# Patient Record
Sex: MALE | Race: WHITE | NOT HISPANIC OR LATINO | Employment: OTHER | URBAN - METROPOLITAN AREA
[De-identification: names, ages, dates, MRNs, and addresses within clinical notes are randomized per-mention and may not be internally consistent; named-entity substitution may affect disease eponyms.]

---

## 2020-11-28 ENCOUNTER — OFFICE VISIT (OUTPATIENT)
Dept: URGENT CARE | Facility: CLINIC | Age: 65
End: 2020-11-28
Payer: MEDICARE

## 2020-11-28 VITALS
WEIGHT: 173 LBS | OXYGEN SATURATION: 95 % | TEMPERATURE: 98.1 F | HEIGHT: 66 IN | BODY MASS INDEX: 27.8 KG/M2 | SYSTOLIC BLOOD PRESSURE: 130 MMHG | DIASTOLIC BLOOD PRESSURE: 80 MMHG | HEART RATE: 95 BPM | RESPIRATION RATE: 16 BRPM

## 2020-11-28 DIAGNOSIS — R50.9 FEVER, UNSPECIFIED FEVER CAUSE: Primary | ICD-10-CM

## 2020-11-28 PROCEDURE — G0463 HOSPITAL OUTPT CLINIC VISIT: HCPCS | Performed by: PHYSICIAN ASSISTANT

## 2020-11-28 PROCEDURE — 99213 OFFICE O/P EST LOW 20 MIN: CPT | Performed by: PHYSICIAN ASSISTANT

## 2020-11-28 PROCEDURE — U0003 INFECTIOUS AGENT DETECTION BY NUCLEIC ACID (DNA OR RNA); SEVERE ACUTE RESPIRATORY SYNDROME CORONAVIRUS 2 (SARS-COV-2) (CORONAVIRUS DISEASE [COVID-19]), AMPLIFIED PROBE TECHNIQUE, MAKING USE OF HIGH THROUGHPUT TECHNOLOGIES AS DESCRIBED BY CMS-2020-01-R: HCPCS | Performed by: PHYSICIAN ASSISTANT

## 2020-11-28 RX ORDER — TAMSULOSIN HYDROCHLORIDE 0.4 MG/1
CAPSULE ORAL
COMMUNITY

## 2020-11-28 RX ORDER — FLUTICASONE PROPIONATE 50 MCG
1 SPRAY, SUSPENSION (ML) NASAL DAILY
Qty: 16 G | Refills: 0 | Status: SHIPPED | OUTPATIENT
Start: 2020-11-28

## 2020-11-28 RX ORDER — ALBUTEROL SULFATE 2.5 MG/3ML
2.5 SOLUTION RESPIRATORY (INHALATION) EVERY 4 HOURS PRN
COMMUNITY
Start: 2020-09-15

## 2020-11-28 RX ORDER — ROSUVASTATIN CALCIUM 10 MG/1
TABLET, COATED ORAL
COMMUNITY
Start: 2020-09-08

## 2020-11-28 RX ORDER — LEVOTHYROXINE SODIUM 0.05 MG/1
TABLET ORAL
COMMUNITY
Start: 2020-09-14

## 2020-11-28 RX ORDER — WARFARIN SODIUM 2.5 MG/1
1.25-2.5 TABLET ORAL
COMMUNITY
Start: 2020-07-20

## 2020-11-28 RX ORDER — ESOMEPRAZOLE MAGNESIUM 40 MG/1
40 CAPSULE, DELAYED RELEASE ORAL
COMMUNITY

## 2020-11-28 RX ORDER — PREDNISONE 10 MG/1
10 TABLET ORAL DAILY
COMMUNITY
Start: 2020-11-15

## 2020-11-30 ENCOUNTER — TELEPHONE (OUTPATIENT)
Dept: URGENT CARE | Facility: CLINIC | Age: 65
End: 2020-11-30

## 2020-11-30 LAB — SARS-COV-2 RNA SPEC QL NAA+PROBE: DETECTED

## 2023-04-06 ENCOUNTER — OFFICE VISIT (OUTPATIENT)
Dept: URGENT CARE | Facility: CLINIC | Age: 68
End: 2023-04-06

## 2023-04-06 ENCOUNTER — APPOINTMENT (OUTPATIENT)
Dept: RADIOLOGY | Facility: CLINIC | Age: 68
End: 2023-04-06

## 2023-04-06 VITALS
OXYGEN SATURATION: 95 % | SYSTOLIC BLOOD PRESSURE: 137 MMHG | TEMPERATURE: 97.7 F | RESPIRATION RATE: 18 BRPM | HEART RATE: 75 BPM | DIASTOLIC BLOOD PRESSURE: 75 MMHG

## 2023-04-06 DIAGNOSIS — M25.561 ACUTE PAIN OF RIGHT KNEE: ICD-10-CM

## 2023-04-06 DIAGNOSIS — S80.01XA CONTUSION OF RIGHT PATELLA, INITIAL ENCOUNTER: Primary | ICD-10-CM

## 2023-04-06 PROBLEM — N40.0 BENIGN PROSTATIC HYPERPLASIA: Status: ACTIVE | Noted: 2017-11-16

## 2023-04-06 PROBLEM — S80.10XA LEG HEMATOMA: Status: ACTIVE | Noted: 2019-10-01

## 2023-04-06 PROBLEM — S32.000A COMPRESSION FRACTURE OF LUMBAR VERTEBRA (HCC): Status: ACTIVE | Noted: 2022-09-07

## 2023-04-06 PROBLEM — J20.9 ACUTE BRONCHITIS: Status: ACTIVE | Noted: 2017-11-16

## 2023-04-06 PROBLEM — Z48.89 AFTERCARE FOLLOWING SURGERY: Status: ACTIVE | Noted: 2019-11-07

## 2023-04-06 PROBLEM — G44.029 CHRONIC CLUSTER HEADACHE, NOT INTRACTABLE: Status: ACTIVE | Noted: 2017-11-16

## 2023-04-06 PROBLEM — F51.04 CHRONIC INSOMNIA: Status: ACTIVE | Noted: 2017-11-16

## 2023-04-06 PROBLEM — R10.12 LEFT UPPER QUADRANT PAIN: Status: ACTIVE | Noted: 2019-11-13

## 2023-04-06 PROBLEM — U07.1 COVID-19: Status: ACTIVE | Noted: 2020-12-21

## 2023-04-06 PROBLEM — M48.061 STENOSIS OF LATERAL RECESS OF LUMBAR SPINE: Status: ACTIVE | Noted: 2023-01-20

## 2023-04-06 PROBLEM — R22.42 LOCALIZED SWELLING OF LEFT LOWER LEG: Status: ACTIVE | Noted: 2020-03-12

## 2023-04-06 PROBLEM — Z79.01 ANTICOAGULATED ON COUMADIN: Status: ACTIVE | Noted: 2019-04-02

## 2023-04-06 PROBLEM — R41.3 AMNESIA: Status: ACTIVE | Noted: 2017-11-16

## 2023-04-06 PROBLEM — I82.452 DEEP VENOUS THROMBOSIS (DVT) OF LEFT PERONEAL VEIN (HCC): Status: ACTIVE | Noted: 2020-03-12

## 2023-04-06 PROBLEM — E55.9 VITAMIN D DEFICIENCY: Status: ACTIVE | Noted: 2021-01-11

## 2023-04-06 PROBLEM — J45.909 ASTHMA: Status: ACTIVE | Noted: 2017-11-16

## 2023-04-06 PROBLEM — G57.62 MORTON'S NEUROMA OF LEFT FOOT: Status: ACTIVE | Noted: 2022-12-14

## 2023-04-06 PROBLEM — Z79.01 LONG TERM CURRENT USE OF ANTICOAGULANT: Status: ACTIVE | Noted: 2017-11-16

## 2023-04-06 PROBLEM — S22.000A COMPRESSION FRACTURE OF THORACIC VERTEBRA (HCC): Status: ACTIVE | Noted: 2022-05-04

## 2023-04-06 PROBLEM — R73.03 PREDIABETES: Status: ACTIVE | Noted: 2021-01-18

## 2023-04-06 PROBLEM — M81.0 OSTEOPOROSIS: Status: ACTIVE | Noted: 2023-01-10

## 2023-04-06 PROBLEM — L24.5 IRRITANT CONTACT DERMATITIS DUE TO OTHER CHEMICAL PRODUCTS: Status: ACTIVE | Noted: 2017-11-16

## 2023-04-06 PROBLEM — J44.9 CHRONIC OBSTRUCTIVE PULMONARY DISEASE (HCC): Status: ACTIVE | Noted: 2017-11-16

## 2023-04-06 PROBLEM — Z90.5 H/O PARTIAL NEPHRECTOMY: Status: ACTIVE | Noted: 2019-03-05

## 2023-04-06 PROBLEM — S22.32XD CLOSED FRACTURE OF RIB OF LEFT SIDE WITH ROUTINE HEALING: Status: ACTIVE | Noted: 2019-11-07

## 2023-04-06 PROBLEM — Z86.16 HISTORY OF 2019 NOVEL CORONAVIRUS DISEASE (COVID-19): Status: ACTIVE | Noted: 2021-04-15

## 2023-04-06 PROBLEM — E53.8 B12 DEFICIENCY: Status: ACTIVE | Noted: 2021-01-11

## 2023-04-06 RX ORDER — TADALAFIL 20 MG/1
TABLET ORAL
COMMUNITY
Start: 2023-02-13

## 2023-04-06 RX ORDER — FLUTICASONE FUROATE, UMECLIDINIUM BROMIDE AND VILANTEROL TRIFENATATE 200; 62.5; 25 UG/1; UG/1; UG/1
POWDER RESPIRATORY (INHALATION)
COMMUNITY
Start: 2023-02-16

## 2023-04-06 RX ORDER — OXYBUTYNIN CHLORIDE 10 MG/1
10 TABLET, EXTENDED RELEASE ORAL DAILY
COMMUNITY
Start: 2022-12-24

## 2023-04-06 RX ORDER — ESZOPICLONE 2 MG/1
TABLET, FILM COATED ORAL
COMMUNITY
Start: 2023-03-29

## 2023-04-06 RX ORDER — SILDENAFIL CITRATE 20 MG/1
TABLET ORAL
COMMUNITY

## 2023-04-06 RX ORDER — FAMOTIDINE 40 MG/1
40 TABLET, FILM COATED ORAL DAILY
COMMUNITY
Start: 2023-03-13

## 2023-04-06 RX ORDER — CYANOCOBALAMIN 1000 UG/ML
INJECTION, SOLUTION INTRAMUSCULAR; SUBCUTANEOUS
COMMUNITY
Start: 2023-03-11

## 2023-04-06 NOTE — PATIENT INSTRUCTIONS
Keep knee in brace while weight-bearing  Ice, elevate, and compress extremity often for next 24-48 hours  Avoid weight-bearing as much as possible  May take tylenol/ibuprofen every 4-6 hours as needed for pain  Follow-up with orthopedics for further management  Report to ER if symptoms worsen or develop numbness, tingling, or worsening pain/swelling in extremity

## 2023-06-14 ENCOUNTER — TELEPHONE (OUTPATIENT)
Dept: GASTROENTEROLOGY | Facility: CLINIC | Age: 68
End: 2023-06-14

## 2023-08-26 ENCOUNTER — HOSPITAL ENCOUNTER (EMERGENCY)
Facility: HOSPITAL | Age: 68
Discharge: HOME | End: 2023-08-26
Attending: EMERGENCY MEDICINE | Admitting: EMERGENCY MEDICINE
Payer: MEDICARE

## 2023-08-26 ENCOUNTER — APPOINTMENT (EMERGENCY)
Dept: RADIOLOGY | Facility: HOSPITAL | Age: 68
End: 2023-08-26
Payer: MEDICARE

## 2023-08-26 VITALS
TEMPERATURE: 97.8 F | HEART RATE: 75 BPM | DIASTOLIC BLOOD PRESSURE: 77 MMHG | OXYGEN SATURATION: 95 % | SYSTOLIC BLOOD PRESSURE: 135 MMHG | RESPIRATION RATE: 18 BRPM

## 2023-08-26 DIAGNOSIS — M70.51 SUPRAPATELLAR BURSITIS OF RIGHT KNEE: Primary | ICD-10-CM

## 2023-08-26 PROCEDURE — 99283 EMERGENCY DEPT VISIT LOW MDM: CPT

## 2023-08-26 PROCEDURE — 99284 EMERGENCY DEPT VISIT MOD MDM: CPT | Performed by: EMERGENCY MEDICINE

## 2023-08-26 PROCEDURE — 73564 X-RAY EXAM KNEE 4 OR MORE: CPT

## 2023-08-26 RX ORDER — OXYCODONE HYDROCHLORIDE AND ACETAMINOPHEN 5; 325 MG/1; MG/1
1 TABLET ORAL EVERY 6 HOURS PRN
Qty: 30 TABLET | Refills: 0 | Status: SHIPPED | OUTPATIENT
Start: 2023-08-26

## 2023-08-26 RX ORDER — OXYCODONE HYDROCHLORIDE AND ACETAMINOPHEN 5; 325 MG/1; MG/1
2 TABLET ORAL ONCE
Status: COMPLETED | OUTPATIENT
Start: 2023-08-26 | End: 2023-08-26

## 2023-08-26 RX ORDER — PREDNISONE 20 MG/1
40 TABLET ORAL DAILY
Qty: 10 TABLET | Refills: 0 | Status: SHIPPED | OUTPATIENT
Start: 2023-08-26 | End: 2023-08-31

## 2023-08-26 RX ORDER — PREDNISONE 20 MG/1
60 TABLET ORAL ONCE
Status: COMPLETED | OUTPATIENT
Start: 2023-08-26 | End: 2023-08-26

## 2023-08-26 RX ADMIN — OXYCODONE HYDROCHLORIDE AND ACETAMINOPHEN 2 TABLET: 5; 325 TABLET ORAL at 11:16

## 2023-08-26 RX ADMIN — PREDNISONE 60 MG: 20 TABLET ORAL at 11:31

## 2023-08-26 NOTE — DISCHARGE INSTRUCTIONS
Ice and elevation  Use your crutches  Percocet 1 or 2 tabs every 6 hours if needed for pain caution narcotic make you sleepy  Prednisone daily for the next 5 days to reduce inflammation  Follow-up with your orthopedist

## 2023-09-01 ENCOUNTER — APPOINTMENT (EMERGENCY)
Dept: CT IMAGING | Facility: HOSPITAL | Age: 68
End: 2023-09-01
Payer: MEDICARE

## 2023-09-01 ENCOUNTER — HOSPITAL ENCOUNTER (EMERGENCY)
Facility: HOSPITAL | Age: 68
Discharge: HOME | End: 2023-09-01
Attending: EMERGENCY MEDICINE
Payer: MEDICARE

## 2023-09-01 VITALS
WEIGHT: 148 LBS | HEIGHT: 66 IN | TEMPERATURE: 97.8 F | DIASTOLIC BLOOD PRESSURE: 84 MMHG | HEART RATE: 67 BPM | OXYGEN SATURATION: 97 % | SYSTOLIC BLOOD PRESSURE: 155 MMHG | RESPIRATION RATE: 18 BRPM | BODY MASS INDEX: 23.78 KG/M2

## 2023-09-01 DIAGNOSIS — M25.00 HEMARTHROSIS: Primary | ICD-10-CM

## 2023-09-01 LAB
ANION GAP SERPL CALCULATED.3IONS-SCNC: 6 MMOL/L
ATRIAL RATE: 69 BPM
BASOPHILS # BLD AUTO: 0.05 THOUSANDS/ÂΜL (ref 0–0.1)
BASOPHILS NFR BLD AUTO: 0 % (ref 0–1)
BUN SERPL-MCNC: 21 MG/DL (ref 5–25)
CALCIUM SERPL-MCNC: 7.9 MG/DL (ref 8.4–10.2)
CHLORIDE SERPL-SCNC: 108 MMOL/L (ref 96–108)
CO2 SERPL-SCNC: 24 MMOL/L (ref 21–32)
CREAT SERPL-MCNC: 1.01 MG/DL (ref 0.6–1.3)
CRP SERPL QL: <1 MG/L
CRYSTALS SNV QL MICRO: NORMAL
EOSINOPHIL # BLD AUTO: 0.13 THOUSAND/ÂΜL (ref 0–0.61)
EOSINOPHIL NFR BLD AUTO: 1 % (ref 0–6)
ERYTHROCYTE [DISTWIDTH] IN BLOOD BY AUTOMATED COUNT: 13.7 % (ref 11.6–15.1)
ERYTHROCYTE [SEDIMENTATION RATE] IN BLOOD: <1 MM/HOUR (ref 0–19)
GFR SERPL CREATININE-BSD FRML MDRD: 75 ML/MIN/1.73SQ M
GLUCOSE SERPL-MCNC: 109 MG/DL (ref 65–140)
GRAM STN SPEC: NORMAL
HCT VFR BLD AUTO: 40.5 % (ref 36.5–49.3)
HGB BLD-MCNC: 13.3 G/DL (ref 12–17)
IMM GRANULOCYTES # BLD AUTO: 0.2 THOUSAND/UL (ref 0–0.2)
IMM GRANULOCYTES NFR BLD AUTO: 1 % (ref 0–2)
LACTATE SERPL-SCNC: 1.7 MMOL/L (ref 0.5–2)
LYMPHOCYTES # BLD AUTO: 2.69 THOUSANDS/ÂΜL (ref 0.6–4.47)
LYMPHOCYTES NFR BLD AUTO: 12 % (ref 14–44)
MCH RBC QN AUTO: 30.3 PG (ref 26.8–34.3)
MCHC RBC AUTO-ENTMCNC: 32.8 G/DL (ref 31.4–37.4)
MCV RBC AUTO: 92 FL (ref 82–98)
MONOCYTES # BLD AUTO: 1.82 THOUSAND/ÂΜL (ref 0.17–1.22)
MONOCYTES NFR BLD AUTO: 8 % (ref 4–12)
NEUTROPHILS # BLD AUTO: 17.62 THOUSANDS/ÂΜL (ref 1.85–7.62)
NEUTS SEG NFR BLD AUTO: 78 % (ref 43–75)
NRBC BLD AUTO-RTO: 0 /100 WBCS
P AXIS: 74 DEGREES
PLATELET # BLD AUTO: 382 THOUSANDS/UL (ref 149–390)
PMV BLD AUTO: 9.7 FL (ref 8.9–12.7)
POTASSIUM SERPL-SCNC: 3.2 MMOL/L (ref 3.5–5.3)
PR INTERVAL: 146 MS
QRS AXIS: 71 DEGREES
QRSD INTERVAL: 84 MS
QT INTERVAL: 378 MS
QTC INTERVAL: 405 MS
RBC # BLD AUTO: 4.39 MILLION/UL (ref 3.88–5.62)
SODIUM SERPL-SCNC: 138 MMOL/L (ref 135–147)
T WAVE AXIS: 74 DEGREES
URATE SERPL-MCNC: 5 MG/DL (ref 3.5–8.5)
VENTRICULAR RATE: 69 BPM
WBC # BLD AUTO: 22.51 THOUSAND/UL (ref 4.31–10.16)

## 2023-09-01 PROCEDURE — 85025 COMPLETE CBC W/AUTO DIFF WBC: CPT | Performed by: EMERGENCY MEDICINE

## 2023-09-01 PROCEDURE — 99284 EMERGENCY DEPT VISIT MOD MDM: CPT | Performed by: EMERGENCY MEDICINE

## 2023-09-01 PROCEDURE — 96376 TX/PRO/DX INJ SAME DRUG ADON: CPT

## 2023-09-01 PROCEDURE — 36415 COLL VENOUS BLD VENIPUNCTURE: CPT | Performed by: EMERGENCY MEDICINE

## 2023-09-01 PROCEDURE — 96375 TX/PRO/DX INJ NEW DRUG ADDON: CPT

## 2023-09-01 PROCEDURE — 73700 CT LOWER EXTREMITY W/O DYE: CPT

## 2023-09-01 PROCEDURE — 80048 BASIC METABOLIC PNL TOTAL CA: CPT | Performed by: EMERGENCY MEDICINE

## 2023-09-01 PROCEDURE — G1004 CDSM NDSC: HCPCS

## 2023-09-01 PROCEDURE — 93005 ELECTROCARDIOGRAM TRACING: CPT

## 2023-09-01 PROCEDURE — 87070 CULTURE OTHR SPECIMN AEROBIC: CPT | Performed by: EMERGENCY MEDICINE

## 2023-09-01 PROCEDURE — 87205 SMEAR GRAM STAIN: CPT | Performed by: EMERGENCY MEDICINE

## 2023-09-01 PROCEDURE — 84550 ASSAY OF BLOOD/URIC ACID: CPT | Performed by: EMERGENCY MEDICINE

## 2023-09-01 PROCEDURE — 89060 EXAM SYNOVIAL FLUID CRYSTALS: CPT | Performed by: EMERGENCY MEDICINE

## 2023-09-01 PROCEDURE — 96374 THER/PROPH/DIAG INJ IV PUSH: CPT

## 2023-09-01 PROCEDURE — 93010 ELECTROCARDIOGRAM REPORT: CPT | Performed by: INTERNAL MEDICINE

## 2023-09-01 PROCEDURE — 85652 RBC SED RATE AUTOMATED: CPT | Performed by: EMERGENCY MEDICINE

## 2023-09-01 PROCEDURE — 86140 C-REACTIVE PROTEIN: CPT | Performed by: EMERGENCY MEDICINE

## 2023-09-01 PROCEDURE — 83605 ASSAY OF LACTIC ACID: CPT | Performed by: EMERGENCY MEDICINE

## 2023-09-01 PROCEDURE — 99284 EMERGENCY DEPT VISIT MOD MDM: CPT

## 2023-09-01 RX ORDER — DEXAMETHASONE SODIUM PHOSPHATE 10 MG/ML
8 INJECTION, SOLUTION INTRAMUSCULAR; INTRAVENOUS ONCE
Status: COMPLETED | OUTPATIENT
Start: 2023-09-01 | End: 2023-09-01

## 2023-09-01 RX ORDER — MORPHINE SULFATE 4 MG/ML
4 INJECTION, SOLUTION INTRAMUSCULAR; INTRAVENOUS ONCE
Status: COMPLETED | OUTPATIENT
Start: 2023-09-01 | End: 2023-09-01

## 2023-09-01 RX ORDER — LIDOCAINE HYDROCHLORIDE AND EPINEPHRINE 10; 10 MG/ML; UG/ML
10 INJECTION, SOLUTION INFILTRATION; PERINEURAL ONCE
Status: COMPLETED | OUTPATIENT
Start: 2023-09-01 | End: 2023-09-01

## 2023-09-01 RX ADMIN — MORPHINE SULFATE 4 MG: 4 INJECTION INTRAVENOUS at 03:07

## 2023-09-01 RX ADMIN — MORPHINE SULFATE 4 MG: 4 INJECTION INTRAVENOUS at 10:59

## 2023-09-01 RX ADMIN — DEXAMETHASONE SODIUM PHOSPHATE 8 MG: 10 INJECTION, SOLUTION INTRAMUSCULAR; INTRAVENOUS at 03:07

## 2023-09-01 RX ADMIN — LIDOCAINE HYDROCHLORIDE,EPINEPHRINE BITARTRATE 10 ML: 10; .01 INJECTION, SOLUTION INFILTRATION; PERINEURAL at 10:00

## 2023-09-01 NOTE — ED PROVIDER NOTES
History  Chief Complaint   Patient presents with   • Leg Pain     Arrived from home via EMS. Per EMS, approx @1900, Pt c/o right knee to ankle pain. Took percocet with no relief @1900. Pt was seen last week for same. Pt unable to ambulate, Hx: osteoporosis. Presently experiencing SOB. No CP, no headaches, no dizziness, no N/V at present. This patient presents emergency department with right lower extremity pain. He comes to the emergency department via EMS. The pain is maximal at the right knee with swelling and some warmth. No fever no chills. Pain is moderate to severe and constant. He was seen in the emergency department and diagnosed with bursitis. History provided by:  Patient   used: No        Prior to Admission Medications   Prescriptions Last Dose Informant Patient Reported? Taking? Diclofenac Sodium (VOLTAREN) 1 %   Yes No   Sig: APPLY 2 GRAMS TO THE AFFECTED AREA(S) BY TOPICAL ROUTE 2-4 TIMES PER DAY   Probiotic CAPS   Yes No   Sig: Take 1 capsule daily   Trelegy Ellipta 200-62.5-25 MCG/ACT AEPB inhaler   Yes No   Sig: INHALE 1 PUFF BY MOUTH IN THE MORNING   Ventolin  (90 Base) MCG/ACT inhaler   Yes No   Sig: TAKE 2 PUFFS BY MOUTH EVERY 6 HOURS AS NEEDED FOR WHEEZE   albuterol (2.5 mg/3 mL) 0.083 % nebulizer solution   Yes No   Sig: Inhale 2.5 mg every 4 (four) hours as needed   cyanocobalamin 1,000 mcg/mL   Yes No   Sig: INJECT 1 ML (1,000 MCG TOTAL) INJECT INTO THE MUSCLE EVERY 2 (TWO) MONTHS.    esomeprazole (NexIUM) 40 MG capsule   Yes No   Sig: Take 40 mg by mouth every morning before breakfast   eszopiclone (LUNESTA) 2 mg tablet   Yes No   Sig: TAKE 1 TABLET BY MOUTH NIGHTLY AS NEEDED FOR SLEEP   famotidine (PEPCID) 40 MG tablet   Yes No   Sig: Take 40 mg by mouth daily   fluticasone (FLONASE) 50 mcg/act nasal spray   No No   Si spray into each nostril daily   Patient not taking: Reported on 2023   levothyroxine 50 mcg tablet   Yes No   Sig: Take 1 tablet by mouth once daily   oxyCODONE-acetaminophen (PERCOCET) 5-325 mg per tablet   No No   Sig: Take 1 tablet by mouth every 6 (six) hours as needed for moderate pain or severe pain for up to 30 doses Max Daily Amount: 4 tablets   oxybutynin (DITROPAN-XL) 10 MG 24 hr tablet   Yes No   Sig: Take 10 mg by mouth daily   predniSONE 10 mg tablet   Yes No   Sig: Take 10 mg by mouth daily   Patient not taking: Reported on 2023   predniSONE 20 mg tablet   No No   Sig: Take 2 tablets (40 mg total) by mouth daily for 5 days   rosuvastatin (CRESTOR) 10 MG tablet   Yes No   Sig: Take 1 tablet by mouth nightly   sildenafil (REVATIO) 20 mg tablet   Yes No   tadalafil (CIALIS) 20 MG tablet   Yes No   Sig: TAKE 1 TABLET BY MOUTH EVERY 24 HOURS AS NEEDED   tamsulosin (FLOMAX) 0.4 mg   Yes No   Si   warfarin (COUMADIN) 2.5 mg tablet   Yes No   Sig: Take 1.25-2.5 mg by mouth      Facility-Administered Medications: None       Past Medical History:   Diagnosis Date   • COPD (chronic obstructive pulmonary disease) ([address])        History reviewed. No pertinent surgical history. History reviewed. No pertinent family history. I have reviewed and agree with the history as documented. E-Cigarette/Vaping   • E-Cigarette Use Never User      E-Cigarette/Vaping Substances     Social History     Tobacco Use   • Smoking status: Every Day     Packs/day: 0.50     Types: Cigarettes   • Smokeless tobacco: Never   Vaping Use   • Vaping Use: Never used   Substance Use Topics   • Alcohol use: Yes   • Drug use: Not Currently       Review of Systems   Constitutional: Negative for chills and fever. HENT: Negative for ear pain and sore throat. Eyes: Negative for pain and visual disturbance. Respiratory: Negative for cough and shortness of breath. Cardiovascular: Negative for chest pain and palpitations. Gastrointestinal: Negative for abdominal pain and vomiting. Genitourinary: Negative for dysuria and hematuria. Musculoskeletal: Negative for arthralgias and back pain. Skin: Negative for color change and rash. Neurological: Negative for seizures and syncope. All other systems reviewed and are negative. Physical Exam  Physical Exam  Vitals and nursing note reviewed. Constitutional:       General: He is not in acute distress. Appearance: Normal appearance. He is well-developed. HENT:      Head: Normocephalic and atraumatic. Right Ear: External ear normal.      Left Ear: External ear normal.      Nose: Nose normal.   Eyes:      Conjunctiva/sclera: Conjunctivae normal.      Pupils: Pupils are equal, round, and reactive to light. Cardiovascular:      Rate and Rhythm: Normal rate and regular rhythm. Pulses: Normal pulses. Heart sounds: Normal heart sounds. No murmur heard. Pulmonary:      Effort: Pulmonary effort is normal. No respiratory distress. Breath sounds: Normal breath sounds. Abdominal:      Palpations: Abdomen is soft. Tenderness: There is no abdominal tenderness. Musculoskeletal:         General: No swelling. Cervical back: Neck supple. Right knee: Swelling and laceration present. No deformity, effusion, erythema, ecchymosis, bony tenderness or crepitus. Decreased range of motion. Tenderness present. Skin:     General: Skin is warm and dry. Capillary Refill: Capillary refill takes less than 2 seconds. Neurological:      General: No focal deficit present. Mental Status: He is alert and oriented to person, place, and time. Psychiatric:         Mood and Affect: Mood normal.         Thought Content:  Thought content normal.         Vital Signs  ED Triage Vitals   Temperature Pulse Respirations Blood Pressure SpO2   09/01/23 0249 09/01/23 0246 09/01/23 0246 09/01/23 0246 09/01/23 0246   97.8 °F (36.6 °C) 64 17 (!) 181/95 97 %      Temp Source Heart Rate Source Patient Position - Orthostatic VS BP Location FiO2 (%)   09/01/23 0249 09/01/23 0246 09/01/23 0246 09/01/23 0246 --   Oral Monitor Sitting Right arm       Pain Score       09/01/23 0249       10 - Worst Possible Pain           Vitals:    09/01/23 0500 09/01/23 0600 09/01/23 0800 09/01/23 1000   BP: 165/89 (!) 182/93 (!) 173/99 155/84   Pulse: 72 66 93 67   Patient Position - Orthostatic VS: Lying Lying           Visual Acuity      ED Medications  Medications   morphine injection 4 mg (4 mg Intravenous Given 9/1/23 0307)   dexamethasone (PF) (DECADRON) injection 8 mg (8 mg Intravenous Given 9/1/23 0307)   lidocaine-epinephrine (XYLOCAINE/EPINEPHRINE) 1 %-1:100,000 injection 10 mL (10 mL Infiltration Given 9/1/23 1000)   morphine injection 4 mg (4 mg Intravenous Given 9/1/23 1059)       Diagnostic Studies  Results Reviewed     Procedure Component Value Units Date/Time    Body fluid culture and Gram stain [[id number]] Collected: 09/01/23 1043    Lab Status: Preliminary result Specimen:  Body Fluid from Joint, Right Knee Updated: 09/01/23 1835     Gram Stain Result 2+ Polys      No bacteria seen    Synovial fluid, crystal [[id number]] Collected: 09/01/23 1043    Lab Status: Final result Specimen: Synovial Fluid from Joint, Right Hip Updated: 09/01/23 1630     Crystals, Synovial Fluid No Crystals Seen    STAT Gram Stain [[id number]] Collected: 09/01/23 1043    Lab Status: Final result Specimen: Other from Joint, Right Knee Updated: 09/01/23 1122     Gram Stain Result 2+ Polys      1+ Mononuclear Cells      No bacteria seen    C-reactive protein [[id number]]  (Normal) Collected: 09/01/23 0302    Lab Status: Final result Specimen: Blood from Arm, Right Updated: 09/01/23 0442     CRP <1.0 mg/L     Basic metabolic panel [[id number]]  (Abnormal) Collected: 09/01/23 0302    Lab Status: Final result Specimen: Blood from Arm, Right Updated: 09/01/23 0426     Sodium 138 mmol/L      Potassium 3.2 mmol/L      Chloride 108 mmol/L      CO2 24 mmol/L      ANION GAP 6 mmol/L      BUN 21 mg/dL      Creatinine 1.01 mg/dL Glucose 109 mg/dL      Calcium 7.9 mg/dL      eGFR 75 ml/min/1.73sq m     Narrative:      [place] guidelines for Chronic Kidney Disease (CKD):   •  Stage 1 with normal or high GFR (GFR > 90 mL/min/1.73 square meters)  •  Stage 2 Mild CKD (GFR = 60-89 mL/min/1.73 square meters)  •  Stage 3A Moderate CKD (GFR = 45-59 mL/min/1.73 square meters)  •  Stage 3B Moderate CKD (GFR = 30-44 mL/min/1.73 square meters)  •  Stage 4 Severe CKD (GFR = 15-29 mL/min/1.73 square meters)  •  Stage 5 End Stage CKD (GFR <15 mL/min/1.73 square meters)  Note: GFR calculation is accurate only with a steady state creatinine    Uric acid [[id number]]  (Normal) Collected: 09/01/23 0302    Lab Status: Final result Specimen: Blood from Arm, Right Updated: 09/01/23 0400     Uric Acid 5.0 mg/dL     Narrative:      N-acetyl-p-benzoquinone imine (metabolite of Acetaminophen) will generate erroneously low results in samples for patients that have taken an overdose of Acetaminophen. Lactic acid, plasma (w/reflex if result > 2.0) [[id number]]  (Normal) Collected: 09/01/23 0302    Lab Status: Final result Specimen: Blood from Arm, Right Updated: 09/01/23 0400     LACTIC ACID 1.7 mmol/L     Narrative:      Result may be elevated if tourniquet was used during collection.     Sedimentation rate, automated [[id number]]  (Normal) Collected: 09/01/23 0302    Lab Status: Final result Specimen: Blood from Arm, Right Updated: 09/01/23 0348     Sed Rate <1 mm/hour     CBC and differential [[id number]]  (Abnormal) Collected: 09/01/23 0302    Lab Status: Final result Specimen: Blood from Arm, Right Updated: 09/01/23 0332     WBC 22.51 Thousand/uL      RBC 4.39 Million/uL      Hemoglobin 13.3 g/dL      Hematocrit 40.5 %      MCV 92 fL      MCH 30.3 pg      MCHC 32.8 g/dL      RDW 13.7 %      MPV 9.7 fL      Platelets 963 Thousands/uL      nRBC 0 /100 WBCs      Neutrophils Relative 78 %      Immat GRANS % 1 %      Lymphocytes Relative 12 % Monocytes Relative 8 %      Eosinophils Relative 1 %      Basophils Relative 0 %      Neutrophils Absolute 17.62 Thousands/µL      Immature Grans Absolute 0.20 Thousand/uL      Lymphocytes Absolute 2.69 Thousands/µL      Monocytes Absolute 1.82 Thousand/µL      Eosinophils Absolute 0.13 Thousand/µL      Basophils Absolute 0.05 Thousands/µL                  CT lower extremity wo contrast right   Final Result by Paulo Rubinstein, MD (09/01 9152)   Expected postoperative appearance after total right knee arthroplasty. Large joint effusion with probable subcentimeter intra-articular body within it consistent with recent plain film findings. No CT findings to indicate infection although correlate    clinically. Workstation performed: [id number]                    Procedures  Procedures         ED Course  ED Course as of 09/01/23 2301   Fri Sep 01, 2023   0357 WBC(!): 22.51   0357 Hemoglobin: 13.3   0357 HCT: 40.5   0357 Sed Rate: <1   0505 LACTIC ACID: 1.7   0505 URIC ACID: 5.0   0505 C-REACTIVE PROTEIN: <1.0               Identification of Seniors at Risk    [employer] Most Recent Value   (ISAR) Identification of Seniors at Risk    Before the illness or injury that brought you to the Emergency, did you need someone to help you on a regular basis? 0 Filed at: 09/01/2023 0253   In the last 24 hours, have you needed more help than usual? 1 Filed at: 09/01/2023 7025   Have you been hospitalized for one or more nights during the past 6 months? 0 Filed at: 09/01/2023 0253   In general, do you see well? 0 Filed at: 09/01/2023 0253   In general, do you have serious problems with your memory? 0 Filed at: 09/01/2023 4624   Do you take more than three different medications every day? 1 Filed at: 09/01/2023 0253   ISAR Score 2 Filed at: 09/01/2023 5871                      SBIRT 20yo+    Flowsheet Row Most Recent Value   Initial Alcohol Screen: US AUDIT-C     1.  How often do you have a drink containing alcohol? 6 Filed at: 09/01/2023 0254   2. How many drinks containing alcohol do you have on a typical day you are drinking? 1 Filed at: 09/01/2023 0254   Audit-C Score 7 Filed at: 09/01/2023 0254   Full Alcohol Screen: US AUDIT    4. How often during the last year have you found that you were not able to stop drinking once you had started? 1 Filed at: 09/01/2023 0254   5. How often during past year have you failed to do what was normally expected of you because of drinking? 0 Filed at: 09/01/2023 0254   6. How often in past year have you needed a first drink in the morning to get yourself going after a heavy drinking session? 1 Filed at: 09/01/2023 0254   7. How often in past year have you had feeling of guilt or remorse after drinking? 0 Filed at: 09/01/2023 0254   8. How often in past year have you been unable to remember what happened night before because you had been drinking? 0 Filed at: 09/01/2023 0254   9. Have you or someone else been injured as a result of your drinking? 0 Filed at: 09/01/2023 0254   10. Has a relative, friend, doctor or other health worker been concerned about your drinking and suggested you cut down? 4 Filed at: 09/01/2023 0254   AUDIT Total Score 13 Filed at: 09/01/2023 5433   SANJIV: How many times in the past year have you. .. Used an illegal drug or used a prescription medication for non-medical reasons? Never Filed at: 09/01/2023 0254                    Medical Decision Making  EKG done at 2:53 AM shows normal sinus rhythm rate of 69 with sinus arrhythmia. Septal infarct. Normal axis. No ST elevations or depressions. Ddx: bursitis, septic joint, arthralgia    Will get labs and CT of knee    Amount and/or Complexity of Data Reviewed  Labs: ordered. Decision-making details documented in ED Course. Radiology: ordered. Risk  Prescription drug management. Patient was signed out to the incoming physician in the morning. To look after the CT scan reports.   After review of the patient medical care shows that patient had an arthrocentesis and was ultimately dispositioned home. Disposition  Final diagnoses:   Hemarthrosis     Time reflects when diagnosis was documented in both MDM as applicable and the Disposition within this note     Time User Action Codes Description Comment    9/1/2023 12:29 PM Lc Yost Add [M25.00] Hemarthrosis       ED Disposition     ED Disposition   Discharge    Condition   Stable    Date/Time   Fri Sep 1, 2023 11:31 AM    Comment   Pema Poe discharge to home/self care. Follow-up Information     Follow up With Specialties Details Why Contact Info Additional [address] Specialists [place] [facility] Schedule an appointment as soon as possible for a visit   [facility]  [address] [facility] [address] [place], [place], [address]    Your Orthopedist  Schedule an appointment as soon as possible for a visit        [facility] Emergency Department Emergency Medicine  If symptoms worsen [address] [facility] Emergency Department, [place], [place], [address]          Discharge Medication List as of 9/1/2023 11:35 AM      CONTINUE these medications which have NOT CHANGED    Details   albuterol (2.5 mg/3 mL) 0.083 % nebulizer solution Inhale 2.5 mg every 4 (four) hours as needed, Starting Tue 9/15/2020, Historical Med      cyanocobalamin 1,000 mcg/mL INJECT 1 ML (1,000 MCG TOTAL) INJECT INTO THE MUSCLE EVERY 2 (TWO) MONTHS. , Historical Med      Diclofenac Sodium (VOLTAREN) 1 % APPLY 2 GRAMS TO THE AFFECTED AREA(S) BY TOPICAL ROUTE 2-4 TIMES PER DAY, Historical Med      esomeprazole (NexIUM) 40 MG capsule Take 40 mg by mouth every morning before breakfast, Historical Med      eszopiclone (LUNESTA) 2 mg tablet TAKE 1 TABLET BY MOUTH NIGHTLY AS NEEDED FOR SLEEP, Historical Med      famotidine (PEPCID) 40 MG tablet Take 40 mg by mouth daily, Starting Mon 3/13/2023, Historical Med      fluticasone (FLONASE) 50 mcg/act nasal spray 1 spray into each nostril daily, Starting Sat 11/28/2020, Normal      levothyroxine 50 mcg tablet Take 1 tablet by mouth once daily, Historical Med      oxybutynin (DITROPAN-XL) 10 MG 24 hr tablet Take 10 mg by mouth daily, Starting Sat 12/24/2022, Historical Med      oxyCODONE-acetaminophen (PERCOCET) 5-325 mg per tablet Take 1 tablet by mouth every 6 (six) hours as needed for moderate pain or severe pain for up to 30 doses Max Daily Amount: 4 tablets, Starting Sat 8/26/2023, Normal      predniSONE 10 mg tablet Take 10 mg by mouth daily, Starting Sun 11/15/2020, Historical Med      Probiotic CAPS Take 1 capsule daily, Historical Med      rosuvastatin (CRESTOR) 10 MG tablet Take 1 tablet by mouth nightly, Historical Med      sildenafil (REVATIO) 20 mg tablet Historical Med      tadalafil (CIALIS) 20 MG tablet TAKE 1 TABLET BY MOUTH EVERY 24 HOURS AS NEEDED, Historical Med      tamsulosin (FLOMAX) 0.4 mg 2, Historical Med      Trelegy Ellipta 200-62.5-25 MCG/ACT AEPB inhaler INHALE 1 PUFF BY MOUTH IN THE MORNING, Historical Med      Ventolin  (90 Base) MCG/ACT inhaler TAKE 2 PUFFS BY MOUTH EVERY 6 HOURS AS NEEDED FOR WHEEZE, Historical Med      warfarin (COUMADIN) 2.5 mg tablet Take 1.25-2.5 mg by mouth, Starting Mon 7/20/2020, Historical Med             No discharge procedures on file.     PDMP Review     None          ED Provider  Electronically Signed by           Mike Benedict MD  09/01/23 4056

## 2023-09-01 NOTE — ED CARE HANDOFF
Emergency Department Sign Out Note        Sign out and transfer of care from Dr. Willi Varner. See Separate Emergency Department note. The patient, Angela Alejandro, was evaluated by the previous provider for knee pain. ED Course / Workup Pending (followup): Identification of Seniors at [facility] Most Recent Value   (ISAR) Identification of Seniors at Risk    Before the illness or injury that brought you to the Emergency, did you need someone to help you on a regular basis? 0 Filed at: 09/01/2023 0253   In the last 24 hours, have you needed more help than usual? 1 Filed at: 09/01/2023 4721   Have you been hospitalized for one or more nights during the past 6 months? 0 Filed at: 09/01/2023 0253   In general, do you see well? 0 Filed at: 09/01/2023 0253   In general, do you have serious problems with your memory? 0 Filed at: 09/01/2023 4485   Do you take more than three different medications every day? 1 Filed at: 09/01/2023 0253   ISAR Score 2 Filed at: 09/01/2023 0253                                ED Course as of 09/01/23 1552   Fri Sep 01, 2023   0934 Orthopedics requesting arthrocentesis performed under sterile technique to assess for signs of septic joint. 1116 Laboratory states that there will be a delay to return of labs as specimen appears consistent with whole blood and will need to be significantly diluted to be analyzed. This is in keeping with my suspicion that patient is experiencing primary hemarthrosis. 1124 Patient's Gram stain with no bacteria seen, have canceled RBC and white cell count due to appearance of whole blood, sent for culture and discussed return precautions and outpatient follow-up which patient states he understands. Arthrocentesis    Date/Time: 9/1/2023 3:52 PM    Performed by: Eduardo Dorsey DO  Authorized by: Eduardo Dorsey DO  Universal Protocol:  Consent: Verbal consent obtained.   Consent given by: patient  Patient understanding: patient states understanding of the procedure being performed  Patient consent: the patient's understanding of the procedure matches consent given  Procedure consent: procedure consent matches procedure scheduled  Relevant documents: relevant documents present and verified  Test results: test results available and properly labeled  Site marked: the operative site was marked  Radiology Images displayed and confirmed. If images not available, report reviewed: imaging studies available  Patient identity confirmed: verbally with patient      Location:  Bedside  Indications:  Joint swelling  Anesthesia:  Local infiltration  Local anesthetic:  Lidocaine 2% without epinephrine  Needle size:  18 G  Ultrasound guidance: No    Approach:  Medial  Aspirate amount (ml):  20  Aspirate:  Bloody  Patient tolerance:  Patient tolerated the procedure well with no immediate complications      MDM        Disposition  Final diagnoses:   Hemarthrosis     Time reflects when diagnosis was documented in both MDM as applicable and the Disposition within this note     Time User Action Codes Description Comment    9/1/2023 12:29 PM Memo Gould Add [M25.00] Hemarthrosis       ED Disposition     ED Disposition   Discharge    Condition   Stable    Date/Time   Fri Sep 1, 2023 11:31 AM    Comment   Jaxon Diop discharge to home/self care.                Follow-up Information     Follow up With Specialties Details Why Contact Info Additional [address] [facility]ists [place] [facility] Schedule an appointment as soon as possible for a visit   [address] [phone] [address] [place] [address],[address] Specialists [place] [address] [place] [address], [phone]    Your Orthopedist  Schedule an appointment as soon as possible for a visit        [facility] Emergency Department Emergency Medicine  If symptoms worsen [address] [facility] [id number] 56[address] [facility] Emergency Department, [place], [address], [address]        Discharge Medication List as of 9/1/2023 11:35 AM      CONTINUE these medications which have NOT CHANGED    Details   albuterol (2.5 mg/3 mL) 0.083 % nebulizer solution Inhale 2.5 mg every 4 (four) hours as needed, Starting Tue 9/15/2020, Historical Med      cyanocobalamin 1,000 mcg/mL INJECT 1 ML (1,000 MCG TOTAL) INJECT INTO THE MUSCLE EVERY 2 (TWO) MONTHS. , Historical Med      Diclofenac Sodium (VOLTAREN) 1 % APPLY 2 GRAMS TO THE AFFECTED AREA(S) BY TOPICAL ROUTE 2-4 TIMES PER DAY, Historical Med      esomeprazole (NexIUM) 40 MG capsule Take 40 mg by mouth every morning before breakfast, Historical Med      eszopiclone (LUNESTA) 2 mg tablet TAKE 1 TABLET BY MOUTH NIGHTLY AS NEEDED FOR SLEEP, Historical Med      famotidine (PEPCID) 40 MG tablet Take 40 mg by mouth daily, Starting Mon 3/13/2023, Historical Med      fluticasone (FLONASE) 50 mcg/act nasal spray 1 spray into each nostril daily, Starting Sat 11/28/2020, Normal      levothyroxine 50 mcg tablet Take 1 tablet by mouth once daily, Historical Med      oxybutynin (DITROPAN-XL) 10 MG 24 hr tablet Take 10 mg by mouth daily, Starting Sat 12/24/2022, Historical Med      oxyCODONE-acetaminophen (PERCOCET) 5-325 mg per tablet Take 1 tablet by mouth every 6 (six) hours as needed for moderate pain or severe pain for up to 30 doses Max Daily Amount: 4 tablets, Starting Sat 8/26/2023, Normal      predniSONE 10 mg tablet Take 10 mg by mouth daily, Starting Sun 11/15/2020, Historical Med      Probiotic CAPS Take 1 capsule daily, Historical Med      rosuvastatin (CRESTOR) 10 MG tablet Take 1 tablet by mouth nightly, Historical Med      sildenafil (REVATIO) 20 mg tablet Historical Med      tadalafil (CIALIS) 20 MG tablet TAKE 1 TABLET BY MOUTH EVERY 24 HOURS AS NEEDED, Historical Med tamsulosin (FLOMAX) 0.4 mg 2, Historical Med      Trelegy Ellipta 200-62.5-25 MCG/ACT AEPB inhaler INHALE 1 PUFF BY MOUTH IN THE MORNING, Historical Med      Ventolin  (90 Base) MCG/ACT inhaler TAKE 2 PUFFS BY MOUTH EVERY 6 HOURS AS NEEDED FOR WHEEZE, Historical Med      warfarin (COUMADIN) 2.5 mg tablet Take 1.25-2.5 mg by mouth, Starting Mon 7/20/2020, Historical Med           No discharge procedures on file.        ED Provider  Electronically Signed by     Zoie Ferris,   09/01/23 [address] [place] [address], DO  09/01/23 7144

## 2023-09-03 LAB
BACTERIA SPEC BFLD CULT: NO GROWTH
GRAM STN SPEC: NORMAL
GRAM STN SPEC: NORMAL

## 2023-09-04 LAB
BACTERIA SPEC BFLD CULT: NO GROWTH
GRAM STN SPEC: NORMAL
GRAM STN SPEC: NORMAL

## 2023-10-28 ENCOUNTER — OFFICE VISIT (OUTPATIENT)
Dept: URGENT CARE | Facility: CLINIC | Age: 68
End: 2023-10-28
Payer: MEDICARE

## 2023-10-28 VITALS
RESPIRATION RATE: 22 BRPM | HEART RATE: 82 BPM | SYSTOLIC BLOOD PRESSURE: 159 MMHG | TEMPERATURE: 98.2 F | DIASTOLIC BLOOD PRESSURE: 80 MMHG | OXYGEN SATURATION: 95 %

## 2023-10-28 DIAGNOSIS — J44.1 COPD WITH ACUTE EXACERBATION (HCC): Primary | ICD-10-CM

## 2023-10-28 PROCEDURE — 99214 OFFICE O/P EST MOD 30 MIN: CPT | Performed by: PHYSICIAN ASSISTANT

## 2023-10-28 PROCEDURE — G0463 HOSPITAL OUTPT CLINIC VISIT: HCPCS | Performed by: PHYSICIAN ASSISTANT

## 2023-10-28 RX ORDER — DOXYCYCLINE 100 MG/1
100 TABLET ORAL 2 TIMES DAILY
Qty: 14 TABLET | Refills: 0 | Status: SHIPPED | OUTPATIENT
Start: 2023-10-28 | End: 2023-11-04

## 2023-10-28 NOTE — PATIENT INSTRUCTIONS
Follow-up with your primary care provider in the next 2-3 days. Any new or worsening symptoms develop get re-evaluated sooner or proceed to the ER. Take antibiotics as prescribed.

## 2023-10-28 NOTE — PROGRESS NOTES
[facility]        NAME: Valeria Adan is a 77 y.o. male  : 1955    MRN: [id number]  DATE: 2023  TIME: 12:44 PM    Assessment and Plan   COPD with acute exacerbation ([address]) [J44.1]  1. COPD with acute exacerbation ([address])          Refusing covid testing. Refused     Patient Instructions     Patient Instructions   Follow-up with your primary care provider in the next 2-3 days. Any new or worsening symptoms develop get re-evaluated sooner or proceed to the ER. Follow up with PCP in 3-5 days. Proceed to  ER if symptoms worsen. Chief Complaint     Chief Complaint   Patient presents with    Shortness of Breath     C/o sob for last few days. Got worse over night. Hx of COPD smokes a pack of cigarettes daily. Increased need for inhaler. Unable to walk short distances with out getting sob          History of Present Illness       Patient presents with trouble taking a deep breath, getting out of breath easily starting 2 days ago. History of COPD and a persistent cough. Bringing up more sputum and the sputum is changing to a more yellow/green color. Denies fevers, chest pains, sick contacts. Shortness of Breath        Review of Systems   Review of Systems   Respiratory:  Positive for shortness of breath.           Current Medications       Current Outpatient Medications:     albuterol (2.5 mg/3 mL) 0.083 % nebulizer solution, Inhale 2.5 mg every 4 (four) hours as needed, Disp: , Rfl:     cyanocobalamin 1,000 mcg/mL, INJECT 1 ML (1,000 MCG TOTAL) INJECT INTO THE MUSCLE EVERY 2 (TWO) MONTHS., Disp: , Rfl:     Diclofenac Sodium (VOLTAREN) 1 %, APPLY 2 GRAMS TO THE AFFECTED AREA(S) BY TOPICAL ROUTE 2-4 TIMES PER DAY, Disp: , Rfl:     esomeprazole (NexIUM) 40 MG capsule, Take 40 mg by mouth every morning before breakfast, Disp: , Rfl:     levothyroxine 50 mcg tablet, Take 1 tablet by mouth once daily, Disp: , Rfl:     oxybutynin (DITROPAN-XL) 10 MG 24 hr tablet, Take 10 mg by mouth daily, Disp: , Rfl:     oxyCODONE-acetaminophen (PERCOCET) 5-325 mg per tablet, Take 1 tablet by mouth every 6 (six) hours as needed for moderate pain or severe pain for up to 30 doses Max Daily Amount: 4 tablets, Disp: 30 tablet, Rfl: 0    Probiotic CAPS, Take 1 capsule daily, Disp: , Rfl:     rosuvastatin (CRESTOR) 10 MG tablet, Take 1 tablet by mouth nightly, Disp: , Rfl:     sildenafil (REVATIO) 20 mg tablet, , Disp: , Rfl:     tadalafil (CIALIS) 20 MG tablet, TAKE 1 TABLET BY MOUTH EVERY 24 HOURS AS NEEDED, Disp: , Rfl:     tamsulosin (FLOMAX) 0.4 mg, 2, Disp: , Rfl:     Trelegy Ellipta 200-62.5-25 MCG/ACT AEPB inhaler, INHALE 1 PUFF BY MOUTH IN THE MORNING, Disp: , Rfl:     Ventolin  (90 Base) MCG/ACT inhaler, TAKE 2 PUFFS BY MOUTH EVERY 6 HOURS AS NEEDED FOR WHEEZE, Disp: , Rfl:     warfarin (COUMADIN) 2.5 mg tablet, Take 1.25-2.5 mg by mouth, Disp: , Rfl:     eszopiclone (LUNESTA) 2 mg tablet, TAKE 1 TABLET BY MOUTH NIGHTLY AS NEEDED FOR SLEEP (Patient not taking: Reported on 10/28/2023), Disp: , Rfl:     famotidine (PEPCID) 40 MG tablet, Take 40 mg by mouth daily (Patient not taking: Reported on 10/28/2023), Disp: , Rfl:     fluticasone (FLONASE) 50 mcg/act nasal spray, 1 spray into each nostril daily (Patient not taking: Reported on 4/6/2023), Disp: 16 g, Rfl: 0    predniSONE 10 mg tablet, Take 10 mg by mouth daily (Patient not taking: Reported on 4/6/2023), Disp: , Rfl:     Current Allergies     Allergies as of 10/28/2023 - Reviewed 09/01/2023   Allergen Reaction Noted    Atorvastatin  01/06/2014    Hydromorphone Rash 07/23/2019    Levofloxacin Anaphylaxis 01/06/2014    Penicillins Rash 09/15/2010    Sulfa antibiotics Rash 11/16/2017            The following portions of the patient's history were reviewed and updated as appropriate: allergies, current medications, past family history, past medical history, past social history, past surgical history and problem list.     Past Medical History: Diagnosis Date    COPD (chronic obstructive pulmonary disease) ([address])        History reviewed. No pertinent surgical history. History reviewed. No pertinent family history. Medications have been verified.         Objective   /80   Pulse 82   Temp 98.2 °F (36.8 °C)   Resp 22   SpO2 95%        Physical Exam     Physical Exam

## 2023-10-28 NOTE — PROGRESS NOTES
[facility]        NAME: Lance Parker is a 77 y.o. male  : 1955    MRN: [id number]  DATE: 2023  TIME: 12:54 PM    Assessment and Plan   COPD with acute exacerbation ([address] [facility]) [J44.1]  1. COPD with acute exacerbation (HCC)  doxycycline (ADOXA) 100 MG tablet        Refusing covid swab. Refusing steroids. Patient Instructions       Follow up with PCP in 3-5 days. Proceed to  ER if symptoms worsen. Chief Complaint     Chief Complaint   Patient presents with    Shortness of Breath     C/o sob for last few days. Got worse over night. Hx of COPD smokes a pack of cigarettes daily. Increased need for inhaler. Unable to walk short distances with out getting sob          History of Present Illness       Patient presents with difficulty taking a deep breath and getting SOB with activity for the past 2 days. Bringing up more sputum and its thicker than usual.  Has a history of COPD. Denies fevers, chest pains, URI symptoms, or sick contacts. Shortness of Breath  Associated symptoms include coughing. Pertinent negatives include no chest pain, fatigue, palpitations, rhinorrhea, sore throat or wheezing. Review of Systems   Review of Systems   Constitutional:  Negative for chills, fatigue and fever. HENT:  Negative for congestion, ear discharge, ear pain, postnasal drip, rhinorrhea, sinus pressure, sinus pain and sore throat. Respiratory:  Positive for cough and shortness of breath. Negative for chest tightness and wheezing. Cardiovascular:  Negative for chest pain and palpitations. Musculoskeletal:  Negative for arthralgias and myalgias. Neurological:  Negative for weakness. Psychiatric/Behavioral:  Negative for confusion.           Current Medications       Current Outpatient Medications:     albuterol (2.5 mg/3 mL) 0.083 % nebulizer solution, Inhale 2.5 mg every 4 (four) hours as needed, Disp: , Rfl:     cyanocobalamin 1,000 mcg/mL, INJECT 1 ML (1,000 MCG TOTAL) INJECT INTO THE MUSCLE EVERY 2 (TWO) MONTHS., Disp: , Rfl:     Diclofenac Sodium (VOLTAREN) 1 %, APPLY 2 GRAMS TO THE AFFECTED AREA(S) BY TOPICAL ROUTE 2-4 TIMES PER DAY, Disp: , Rfl:     doxycycline (ADOXA) 100 MG tablet, Take 1 tablet (100 mg total) by mouth 2 (two) times a day for 7 days, Disp: 14 tablet, Rfl: 0    esomeprazole (NexIUM) 40 MG capsule, Take 40 mg by mouth every morning before breakfast, Disp: , Rfl:     levothyroxine 50 mcg tablet, Take 1 tablet by mouth once daily, Disp: , Rfl:     oxybutynin (DITROPAN-XL) 10 MG 24 hr tablet, Take 10 mg by mouth daily, Disp: , Rfl:     oxyCODONE-acetaminophen (PERCOCET) 5-325 mg per tablet, Take 1 tablet by mouth every 6 (six) hours as needed for moderate pain or severe pain for up to 30 doses Max Daily Amount: 4 tablets, Disp: 30 tablet, Rfl: 0    Probiotic CAPS, Take 1 capsule daily, Disp: , Rfl:     rosuvastatin (CRESTOR) 10 MG tablet, Take 1 tablet by mouth nightly, Disp: , Rfl:     sildenafil (REVATIO) 20 mg tablet, , Disp: , Rfl:     tadalafil (CIALIS) 20 MG tablet, TAKE 1 TABLET BY MOUTH EVERY 24 HOURS AS NEEDED, Disp: , Rfl:     tamsulosin (FLOMAX) 0.4 mg, 2, Disp: , Rfl:     Trelegy Ellipta 200-62.5-25 MCG/ACT AEPB inhaler, INHALE 1 PUFF BY MOUTH IN THE MORNING, Disp: , Rfl:     Ventolin  (90 Base) MCG/ACT inhaler, TAKE 2 PUFFS BY MOUTH EVERY 6 HOURS AS NEEDED FOR WHEEZE, Disp: , Rfl:     warfarin (COUMADIN) 2.5 mg tablet, Take 1.25-2.5 mg by mouth, Disp: , Rfl:     eszopiclone (LUNESTA) 2 mg tablet, TAKE 1 TABLET BY MOUTH NIGHTLY AS NEEDED FOR SLEEP (Patient not taking: Reported on 10/28/2023), Disp: , Rfl:     famotidine (PEPCID) 40 MG tablet, Take 40 mg by mouth daily (Patient not taking: Reported on 10/28/2023), Disp: , Rfl:     fluticasone (FLONASE) 50 mcg/act nasal spray, 1 spray into each nostril daily (Patient not taking: Reported on 4/6/2023), Disp: 16 g, Rfl: 0    predniSONE 10 mg tablet, Take 10 mg by mouth daily (Patient not taking: Reported on 4/6/2023), Disp: , Rfl:     Current Allergies     Allergies as of 10/28/2023 - Reviewed 09/01/2023   Allergen Reaction Noted    Atorvastatin  01/06/2014    Hydromorphone Rash 07/23/2019    Levofloxacin Anaphylaxis 01/06/2014    Penicillins Rash 09/15/2010    Sulfa antibiotics Rash 11/16/2017            The following portions of the patient's history were reviewed and updated as appropriate: allergies, current medications, past family history, past medical history, past social history, past surgical history and problem list.     Past Medical History:   Diagnosis Date    COPD (chronic obstructive pulmonary disease) ([address])        History reviewed. No pertinent surgical history. History reviewed. No pertinent family history. Medications have been verified. Objective   /80   Pulse 82   Temp 98.2 °F (36.8 °C)   Resp 22   SpO2 95%   No LMP for male patient. Physical Exam     Physical Exam  Constitutional:       General: He is not in acute distress. Appearance: Normal appearance. He is not ill-appearing or diaphoretic. HENT:      Right Ear: Tympanic membrane, ear canal and external ear normal.      Left Ear: Tympanic membrane, ear canal and external ear normal.      Nose: Nose normal.      Mouth/Throat:      Mouth: Mucous membranes are moist.      Pharynx: Oropharynx is clear. Eyes:      Conjunctiva/sclera: Conjunctivae normal.   Cardiovascular:      Rate and Rhythm: Normal rate and regular rhythm. Heart sounds: Normal heart sounds. Pulmonary:      Effort: Pulmonary effort is normal.      Breath sounds: Decreased breath sounds (in all lung fields) and wheezing (mildly in all lung fields) present. No rhonchi or rales. Skin:     General: Skin is warm and dry. Neurological:      Mental Status: He is alert.    Psychiatric:         Mood and Affect: Mood normal.         Behavior: Behavior normal.

## 2023-12-24 ENCOUNTER — APPOINTMENT (OUTPATIENT)
Dept: RADIOLOGY | Facility: CLINIC | Age: 68
End: 2023-12-24
Payer: MEDICARE

## 2023-12-24 ENCOUNTER — OFFICE VISIT (OUTPATIENT)
Dept: URGENT CARE | Facility: CLINIC | Age: 68
End: 2023-12-24
Payer: MEDICARE

## 2023-12-24 VITALS
OXYGEN SATURATION: 97 % | TEMPERATURE: 98.2 F | DIASTOLIC BLOOD PRESSURE: 84 MMHG | BODY MASS INDEX: 23.89 KG/M2 | SYSTOLIC BLOOD PRESSURE: 149 MMHG | WEIGHT: 148 LBS | HEART RATE: 74 BPM

## 2023-12-24 DIAGNOSIS — R06.2 WHEEZING: ICD-10-CM

## 2023-12-24 DIAGNOSIS — R05.1 ACUTE COUGH: ICD-10-CM

## 2023-12-24 DIAGNOSIS — J44.1 COPD EXACERBATION (HCC): Primary | ICD-10-CM

## 2023-12-24 LAB
SARS-COV-2 AG UPPER RESP QL IA: NEGATIVE
VALID CONTROL: NORMAL

## 2023-12-24 PROCEDURE — 71046 X-RAY EXAM CHEST 2 VIEWS: CPT

## 2023-12-24 PROCEDURE — 87811 SARS-COV-2 COVID19 W/OPTIC: CPT | Performed by: PHYSICAL MEDICINE & REHABILITATION

## 2023-12-24 PROCEDURE — G0463 HOSPITAL OUTPT CLINIC VISIT: HCPCS | Performed by: PHYSICAL MEDICINE & REHABILITATION

## 2023-12-24 PROCEDURE — 99213 OFFICE O/P EST LOW 20 MIN: CPT | Performed by: PHYSICAL MEDICINE & REHABILITATION

## 2023-12-24 NOTE — PROGRESS NOTES
[facility]        NAME: Andry Cleary is a 68 y.o. male  : 1955    MRN: [id number]  DATE: 2023  TIME: 9:35 AM    Assessment and Plan   COPD exacerbation ([facility]) [J44.1]  1. COPD exacerbation ([facility])        2. Wheezing  XR chest pa & lateral      3. Acute cough  Poct Covid 19 Rapid Antigen Test        Rapid COVID negative  Chest xray with questionable pneumonia to right middle lobe vs COPD exacerbation. Will confirm with final read from radiology. Does have Azithromycin from  he has yet to start taking. Will encourage to take.   Patient also has Prednisone at home he has yet to take. Did encourage to start this as well. Continue inhaler/nebulizer as prescribed.     Patient Instructions   Take antibiotic as prescribed  Will follow up with any positive results on chest xray  Take Nebulizer, Inhaler and steroid as prescribed  If symptoms worsen/persists, proceed to the ER  Follow up with PCP in 3-5 days.    Chief Complaint     Chief Complaint   Patient presents with    Breathing Problem     Pt is here for a cough, chills, body hurts and fever since yesterday. Pt is not sure if he is having an exacerbation from his COPD.          History of Present Illness       Patient presenting with cough, chills, body aches and fever since yesterday. Patient does have a hx of COPD and is unsure if he is in acute exacerbation. He states he has been using his Nebulizer at home more than usual and it is providing minimal relief. He also has taken Tylenol. States his doctor has given him a prescription for Azithromycin stating he does get pneumonia twice a year.     Breathing Problem  He complains of cough and difficulty breathing. Associated symptoms include a fever and myalgias. Pertinent negatives include no postnasal drip, rhinorrhea or sore throat.       Review of Systems   Review of Systems   Constitutional:  Positive for chills and fever.   HENT: Negative.  Negative for congestion, postnasal drip,  rhinorrhea, sinus pressure, sinus pain and sore throat.    Respiratory:  Positive for cough.    Gastrointestinal: Negative.  Negative for abdominal pain, diarrhea, nausea and vomiting.   Musculoskeletal:  Positive for myalgias.   Neurological: Negative.          Current Medications       Current Outpatient Medications:     albuterol (2.5 mg/3 mL) 0.083 % nebulizer solution, Inhale 2.5 mg every 4 (four) hours as needed, Disp: , Rfl:     cyanocobalamin 1,000 mcg/mL, INJECT 1 ML (1,000 MCG TOTAL) INJECT INTO THE MUSCLE EVERY 2 (TWO) MONTHS., Disp: , Rfl:     Diclofenac Sodium (VOLTAREN) 1 %, APPLY 2 GRAMS TO THE AFFECTED AREA(S) BY TOPICAL ROUTE 2-4 TIMES PER DAY, Disp: , Rfl:     esomeprazole (NexIUM) 40 MG capsule, Take 40 mg by mouth every morning before breakfast, Disp: , Rfl:     eszopiclone (LUNESTA) 2 mg tablet, TAKE 1 TABLET BY MOUTH NIGHTLY AS NEEDED FOR SLEEP (Patient not taking: Reported on 10/28/2023), Disp: , Rfl:     famotidine (PEPCID) 40 MG tablet, Take 40 mg by mouth daily (Patient not taking: Reported on 10/28/2023), Disp: , Rfl:     fluticasone (FLONASE) 50 mcg/act nasal spray, 1 spray into each nostril daily (Patient not taking: Reported on 4/6/2023), Disp: 16 g, Rfl: 0    levothyroxine 50 mcg tablet, Take 1 tablet by mouth once daily, Disp: , Rfl:     oxybutynin (DITROPAN-XL) 10 MG 24 hr tablet, Take 10 mg by mouth daily, Disp: , Rfl:     oxyCODONE-acetaminophen (PERCOCET) 5-325 mg per tablet, Take 1 tablet by mouth every 6 (six) hours as needed for moderate pain or severe pain for up to 30 doses Max Daily Amount: 4 tablets, Disp: 30 tablet, Rfl: 0    predniSONE 10 mg tablet, Take 10 mg by mouth daily (Patient not taking: Reported on 4/6/2023), Disp: , Rfl:     Probiotic CAPS, Take 1 capsule daily, Disp: , Rfl:     rosuvastatin (CRESTOR) 10 MG tablet, Take 1 tablet by mouth nightly, Disp: , Rfl:     sildenafil (REVATIO) 20 mg tablet, , Disp: , Rfl:     tadalafil (CIALIS) 20 MG tablet, TAKE 1 TABLET  BY MOUTH EVERY 24 HOURS AS NEEDED, Disp: , Rfl:     tamsulosin (FLOMAX) 0.4 mg, 2, Disp: , Rfl:     Trelegy Ellipta 200-62.5-25 MCG/ACT AEPB inhaler, INHALE 1 PUFF BY MOUTH IN THE MORNING, Disp: , Rfl:     Ventolin  (90 Base) MCG/ACT inhaler, TAKE 2 PUFFS BY MOUTH EVERY 6 HOURS AS NEEDED FOR WHEEZE, Disp: , Rfl:     warfarin (COUMADIN) 2.5 mg tablet, Take 1.25-2.5 mg by mouth, Disp: , Rfl:     Current Allergies     Allergies as of 12/24/2023 - Reviewed 12/24/2023   Allergen Reaction Noted    Atorvastatin  01/06/2014    Hydromorphone Rash 07/23/2019    Levofloxacin Anaphylaxis 01/06/2014    Penicillins Rash 09/15/2010    Sulfa antibiotics Rash 11/16/2017            The following portions of the patient's history were reviewed and updated as appropriate: allergies, current medications, past family history, past medical history, past social history, past surgical history and problem list.     Past Medical History:   Diagnosis Date    COPD (chronic obstructive pulmonary disease) (HCC)        History reviewed. No pertinent surgical history.    History reviewed. No pertinent family history.      Medications have been verified.        Objective   /84   Pulse 74   Temp 98.2 °F (36.8 °C)   Wt 67.1 kg (148 lb)   SpO2 97%   BMI 23.89 kg/m²        Physical Exam     Physical Exam  Constitutional:       General: He is not in acute distress.     Appearance: He is ill-appearing.   HENT:      Right Ear: Tympanic membrane normal.      Left Ear: Tympanic membrane normal.      Nose: No congestion or rhinorrhea.      Mouth/Throat:      Mouth: Mucous membranes are moist.      Pharynx: Oropharynx is clear. Posterior oropharyngeal erythema present. No oropharyngeal exudate.   Eyes:      Conjunctiva/sclera: Conjunctivae normal.   Cardiovascular:      Rate and Rhythm: Normal rate and regular rhythm.      Heart sounds: Normal heart sounds.   Pulmonary:      Effort: Pulmonary effort is normal. No respiratory distress.       Breath sounds: Wheezing present. No rhonchi or rales.   Musculoskeletal:      Cervical back: Normal range of motion and neck supple.   Lymphadenopathy:      Cervical: Cervical adenopathy present.   Skin:     General: Skin is warm.   Neurological:      Mental Status: He is alert.   Psychiatric:         Mood and Affect: Mood normal.         Behavior: Behavior normal.